# Patient Record
Sex: FEMALE | Race: WHITE
[De-identification: names, ages, dates, MRNs, and addresses within clinical notes are randomized per-mention and may not be internally consistent; named-entity substitution may affect disease eponyms.]

---

## 2017-02-01 ENCOUNTER — HOSPITAL ENCOUNTER (OUTPATIENT)
Dept: HOSPITAL 45 - C.PATHSPEC | Age: 77
Discharge: HOME | End: 2017-02-01
Attending: FAMILY MEDICINE
Payer: COMMERCIAL

## 2017-02-01 DIAGNOSIS — L43.9: Primary | ICD-10-CM

## 2017-05-01 ENCOUNTER — HOSPITAL ENCOUNTER (OUTPATIENT)
Dept: HOSPITAL 45 - C.LABMFLN | Age: 77
Discharge: HOME | End: 2017-05-01
Attending: PHYSICIAN ASSISTANT
Payer: COMMERCIAL

## 2017-05-01 DIAGNOSIS — Z13.220: ICD-10-CM

## 2017-05-01 DIAGNOSIS — Z13.1: ICD-10-CM

## 2017-05-01 DIAGNOSIS — Z13.29: ICD-10-CM

## 2017-05-01 DIAGNOSIS — Z00.00: Primary | ICD-10-CM

## 2017-05-01 LAB
ALBUMIN/GLOB SERPL: 1.3 {RATIO} (ref 0.9–2)
ALP SERPL-CCNC: 71 U/L (ref 45–117)
ALT SERPL-CCNC: 27 U/L (ref 12–78)
ANION GAP SERPL CALC-SCNC: 6 MMOL/L (ref 3–11)
AST SERPL-CCNC: 23 U/L (ref 15–37)
BASOPHILS # BLD: 0.07 K/UL (ref 0–0.2)
BASOPHILS NFR BLD: 1.4 %
BUN SERPL-MCNC: 12 MG/DL (ref 7–18)
BUN/CREAT SERPL: 17.3 (ref 10–20)
CALCIUM SERPL-MCNC: 9.6 MG/DL (ref 8.5–10.1)
CHLORIDE SERPL-SCNC: 105 MMOL/L (ref 98–107)
CHOLEST/HDLC SERPL: 3.1 {RATIO}
CO2 SERPL-SCNC: 29 MMOL/L (ref 21–32)
COMPLETE: YES
CREAT SERPL-MCNC: 0.67 MG/DL (ref 0.6–1.2)
EOSINOPHIL NFR BLD AUTO: 260 K/UL (ref 130–400)
GLOBULIN SER-MCNC: 3.1 GM/DL (ref 2.5–4)
GLUCOSE SERPL-MCNC: 86 MG/DL (ref 70–99)
GLUCOSE UR QL: 62 MG/DL
HCT VFR BLD CALC: 39.4 % (ref 37–47)
IG%: 0.2 %
IMM GRANULOCYTES NFR BLD AUTO: 37.8 %
KETONES UR QL STRIP: 110 MG/DL
LYMPHOCYTES # BLD: 1.87 K/UL (ref 1.2–3.4)
MCH RBC QN AUTO: 30.3 PG (ref 25–34)
MCHC RBC AUTO-ENTMCNC: 33.5 G/DL (ref 32–36)
MCV RBC AUTO: 90.6 FL (ref 80–100)
MONOCYTES NFR BLD: 9.3 %
NEUTROPHILS # BLD AUTO: 4.6 %
NEUTROPHILS NFR BLD AUTO: 46.7 %
NITRITE UR QL STRIP: 97 MG/DL (ref 0–150)
PH UR: 191 MG/DL (ref 0–200)
PMV BLD AUTO: 8.7 FL (ref 7.4–10.4)
POTASSIUM SERPL-SCNC: 4.2 MMOL/L (ref 3.5–5.1)
RBC # BLD AUTO: 4.35 M/UL (ref 4.2–5.4)
SODIUM SERPL-SCNC: 140 MMOL/L (ref 136–145)
TSH SERPL-ACNC: 4.61 UIU/ML (ref 0.3–4.5)
VERY LOW DENSITY LIPOPROT CALC: 19 MG/DL
WBC # BLD AUTO: 4.95 K/UL (ref 4.8–10.8)

## 2017-05-08 NOTE — CODING QUERY MEDICAL NECESSITY
CQSUPPORTING DIAGNOSIS NEEDED





A supporting diagnosis is required for the test/procedure performed on this patient in 
order for us to be reimbursed by the patient's insurance. Please provide a supporting 
diagnosis for the following test/procedure listed below next to the test name along with 
your signature. 



*If there is no additional diagnosis for this patient that would support the following 
test/procedure please document that below next to the test/procedure.



Test(s)/Procedure(s) that require a supporting diagnosis:





DOS  05/01/17   BLOOD COUNT     ORDERED BY MARGARITA CURTIS









Provider Signature:  ______________________________  Date:  _______



Thank you  

Radhika Kumari

Health Information Management

Phone:  [phone]

Fax:  [phone]



Once completed, please kindly fax back to [phone]



For questions please call [phone]

## 2018-08-22 ENCOUNTER — HOSPITAL ENCOUNTER (OUTPATIENT)
Dept: HOSPITAL 45 - C.LABMFLN | Age: 78
Discharge: HOME | End: 2018-08-22
Attending: PHYSICIAN ASSISTANT
Payer: COMMERCIAL

## 2018-08-22 DIAGNOSIS — Z79.1: ICD-10-CM

## 2018-08-22 DIAGNOSIS — E03.9: Primary | ICD-10-CM

## 2018-08-22 LAB
ALBUMIN SERPL-MCNC: 4 GM/DL (ref 3.4–5)
ALP SERPL-CCNC: 72 U/L (ref 45–117)
ALT SERPL-CCNC: 27 U/L (ref 12–78)
AST SERPL-CCNC: 20 U/L (ref 15–37)
BUN SERPL-MCNC: 14 MG/DL (ref 7–18)
CALCIUM SERPL-MCNC: 9 MG/DL (ref 8.5–10.1)
CO2 SERPL-SCNC: 28 MMOL/L (ref 21–32)
CREAT SERPL-MCNC: 0.71 MG/DL (ref 0.6–1.2)
GLUCOSE SERPL-MCNC: 86 MG/DL (ref 70–99)
POTASSIUM SERPL-SCNC: 4.4 MMOL/L (ref 3.5–5.1)
PROT SERPL-MCNC: 7.2 GM/DL (ref 6.4–8.2)
SODIUM SERPL-SCNC: 139 MMOL/L (ref 136–145)